# Patient Record
Sex: FEMALE | Race: BLACK OR AFRICAN AMERICAN | Employment: PART TIME | URBAN - METROPOLITAN AREA
[De-identification: names, ages, dates, MRNs, and addresses within clinical notes are randomized per-mention and may not be internally consistent; named-entity substitution may affect disease eponyms.]

---

## 2022-12-20 ENCOUNTER — HOSPITAL ENCOUNTER (EMERGENCY)
Age: 32
Discharge: HOME | End: 2022-12-20
Attending: EMERGENCY MEDICINE
Payer: COMMERCIAL

## 2022-12-20 VITALS
SYSTOLIC BLOOD PRESSURE: 109 MMHG | HEART RATE: 82 BPM | OXYGEN SATURATION: 99 % | TEMPERATURE: 97.5 F | WEIGHT: 165 LBS | BODY MASS INDEX: 25.84 KG/M2 | RESPIRATION RATE: 16 BRPM | DIASTOLIC BLOOD PRESSURE: 75 MMHG

## 2022-12-20 DIAGNOSIS — Z34.90 PREGNANCY, UNSPECIFIED GESTATIONAL AGE: ICD-10-CM

## 2022-12-20 DIAGNOSIS — K59.00 CONSTIPATION, UNSPECIFIED CONSTIPATION TYPE: Primary | ICD-10-CM

## 2022-12-20 LAB
CHP ED QC CHECK: NORMAL
HCG QUANTITATIVE: ABNORMAL MIU/ML
PREGNANCY TEST URINE, POC: POSITIVE

## 2022-12-20 PROCEDURE — 84702 CHORIONIC GONADOTROPIN TEST: CPT

## 2022-12-20 PROCEDURE — 99283 EMERGENCY DEPT VISIT LOW MDM: CPT

## 2022-12-20 RX ORDER — SENNA AND DOCUSATE SODIUM 50; 8.6 MG/1; MG/1
2 TABLET, FILM COATED ORAL ONCE
Status: DISCONTINUED | OUTPATIENT
Start: 2022-12-20 | End: 2022-12-20 | Stop reason: HOSPADM

## 2022-12-20 RX ORDER — SENNA AND DOCUSATE SODIUM 50; 8.6 MG/1; MG/1
2 TABLET, FILM COATED ORAL 2 TIMES DAILY
Qty: 40 TABLET | Refills: 0 | Status: SHIPPED | OUTPATIENT
Start: 2022-12-20 | End: 2022-12-30

## 2022-12-20 ASSESSMENT — ENCOUNTER SYMPTOMS
DIARRHEA: 0
CHEST TIGHTNESS: 0
CONSTIPATION: 1
NAUSEA: 1
ABDOMINAL PAIN: 1
SHORTNESS OF BREATH: 0
SORE THROAT: 0
ANAL BLEEDING: 0
PHOTOPHOBIA: 0
ABDOMINAL DISTENTION: 0
VOMITING: 0
RHINORRHEA: 0

## 2022-12-20 NOTE — ED TRIAGE NOTES
Pt arrives to ED 25 via triage. Pt co constipation. Pt states she has not had a bowel movement in about 2 weeks. Pt states she has been using laxatives the last 3 days. Pt states last time she used a laxative was this morning. Pt denies any chest pain or SOB. Pt states she has abdominal discomfort and cramping but no pain. Pt states she tried having a BM today but nothing came out. Pt respirations are even and unlabored, pt is alert and oriented X 4, speaking in complete sentences, bed is in the lowest position, call light is within reach, NAD noted. Will continue to follow plan of care.

## 2022-12-20 NOTE — ED PROVIDER NOTES
[facility] ED  Emergency Department Encounter  EmergencyMedicine Resident     Pt Amy Proctor  MRN: [id number]  [place] 1990  Date of evaluation: 12/20/22  PCP:  SUNDAR James CNP    CHIEF COMPLAINT       Chief Complaint   Patient presents with    Constipation     X 2 weeks       HISTORY OF PRESENT ILLNESS  (Location/Symptom, Timing/Onset, Context/Setting, Quality, Duration, Modifying Factors, Severity.)      Kody Patterson is a 34 y.o. female who presents with constipation for 2 weeks. Patient states she has not had a bowel movement 2 weeks. She states she had a small 1 today and is infrequently passing gas. She states she is having some nausea but no episodes of emesis. She denies any previous abdominal surgeries. PAST MEDICAL / SURGICAL / SOCIAL / FAMILY HISTORY      has no past medical history on file. has no past surgical history on file.       Social History     Socioeconomic History    Marital status: Single     Spouse name: Not on file    Number of children: Not on file    Years of education: Not on file    Highest education level: Not on file   Occupational History    Not on file   Tobacco Use    Smoking status: Never    Smokeless tobacco: Never   Substance and Sexual Activity    Alcohol use: Yes     Comment: occasional    Drug use: Never    Sexual activity: Not on file   Other Topics Concern    Not on file   Social History Narrative    Not on file     Social Determinants of Health     Financial Resource Strain: Low Risk     Difficulty of Paying Living Expenses: Not hard at all   Food Insecurity: No Food Insecurity    Worried About Running Out of Food in the Last Year: Never true    Ran Out of Food in the Last Year: Never true   Transportation Needs: Not on file   Physical Activity: Not on file   Stress: Not on file   Social Connections: Not on file   Intimate Partner Violence: Not on file   Housing Stability: Not on file       No family history on file.    Allergies:  Patient has no known allergies. Home Medications:  Prior to Admission medications    Medication Sig Start Date End Date Taking? Authorizing Provider   sennosides-docusate sodium (SENOKOT-S) 8.6-50 MG tablet Take 2 tablets by mouth in the morning and at bedtime for 10 days 12/20/22 12/30/22 Yes Cony Givens, DO       REVIEW OF SYSTEMS    (2-9 systems for level 4, 10 or more for level 5)      Review of Systems   Constitutional:  Negative for chills and fever. HENT:  Negative for rhinorrhea and sore throat. Eyes:  Negative for photophobia. Respiratory:  Negative for chest tightness and shortness of breath. Cardiovascular:  Negative for chest pain. Gastrointestinal:  Positive for abdominal pain, constipation and nausea. Negative for abdominal distention, anal bleeding, diarrhea and vomiting. Endocrine: Negative for polyuria. Genitourinary:  Negative for difficulty urinating and flank pain. Musculoskeletal:  Negative for arthralgias. Skin:  Negative for rash. Neurological:  Negative for weakness and headaches. PHYSICAL EXAM   (up to 7 for level 4, 8 or more for level 5)      INITIAL VITALS:   /75   Pulse 82   Temp 97.5 °F (36.4 °C) (Oral)   Resp 16   Wt 165 lb (74.8 kg)   LMP 05/14/2022 (Within Days)   SpO2 99%   BMI 25.84 kg/m²     Physical Exam  Constitutional:       General: She is not in acute distress. Appearance: She is not ill-appearing. HENT:      Head: Normocephalic and atraumatic. Nose: Nose normal.      Mouth/Throat:      Mouth: Mucous membranes are moist.   Eyes:      Extraocular Movements: Extraocular movements intact. Pupils: Pupils are equal, round, and reactive to light. Cardiovascular:      Rate and Rhythm: Normal rate. Pulses: Normal pulses. Heart sounds: Normal heart sounds. Pulmonary:      Effort: Pulmonary effort is normal.      Breath sounds: Normal breath sounds.    Abdominal:      General: There is no distension. Palpations: Abdomen is soft. There is no mass. Tenderness: There is no abdominal tenderness. There is no guarding or rebound. Musculoskeletal:      Cervical back: No tenderness. Right lower leg: No edema. Left lower leg: No edema. Skin:     Capillary Refill: Capillary refill takes less than 2 seconds. Coloration: Skin is not jaundiced. Neurological:      General: No focal deficit present. Mental Status: She is oriented to person, place, and time. DIFFERENTIAL  DIAGNOSIS     PLAN (LABS / IMAGING / EKG):  Orders Placed This Encounter   Procedures    HCG, QUANTITATIVE, PREGNANCY    POCT urine pregnancy       MEDICATIONS ORDERED:  Orders Placed This Encounter   Medications    sennosides-docusate sodium (SENOKOT-S) 8.6-50 MG tablet 2 tablet    sennosides-docusate sodium (SENOKOT-S) 8.6-50 MG tablet     Sig: Take 2 tablets by mouth in the morning and at bedtime for 10 days     Dispense:  40 tablet     Refill:  0         DIAGNOSTIC RESULTS / EMERGENCY DEPARTMENT COURSE / MDM   LAB RESULTS:  Results for orders placed or performed during the hospital encounter of 12/20/22   HCG, QUANTITATIVE, PREGNANCY   Result Value Ref Range    hCG Quant 95,513 (H) <5 mIU/mL   POCT urine pregnancy   Result Value Ref Range    Preg Test, Ur Positive     QC OK? OK        RADIOLOGY:  No results found. EKG Interpretation  None    MDM  Here for constipation no signs and symptoms of obstruction. Patient noted to have a positive pregnancy bedside ultrasound performed intrauterine pregnancy with good fetal heart rate. Given OB follow-up. Patient discharged home    EMERGENCY DEPARTMENT COURSE:  ED Course as of 12/20/22 1949   Tue Dec 20, 2022   1722 Patient value bedside. Constipation for 2 weeks. Belly soft nontender nondistended. Low suspicion for small bowel obstruction or ileus. We will get an acute abdominal series. We will do rectal exam to evaluate for stool ball.   Plan for enema [ZE]   2267 Patient's point-of-care urine pregnancy is positive. We will get serum quant pregnancy. We will hold off on x-ray at this time [ZE]   1946 Bedside ultrasound performed. Heart rate 156. Intrauterine pregnancy identified. Patient states she wants to leave will discharge with OB follow-up [ZE]      ED Course User Index  [ZE] Mey Munoz DO       PROCEDURES:  Procedures     CONSULTS:  None    CRITICAL CARE:  None    FINAL IMPRESSION      1. Constipation, unspecified constipation type    2.  Pregnancy, unspecified gestational age          [de-identified] / PLAN     DISPOSITION Decision To Discharge 12/20/2022 07:44:34 PM      PATIENT REFERRED TO:  [address] [facility]  [address] [facility]  [phone]    Call for [facility]  [address] [facility]  [phone]    If symptoms worsen    Pankaj Colorado, APRN - CNP  [address] [facility]  [phone]    In 1 day      DISCHARGE MEDICATIONS:  New Prescriptions    SENNOSIDES-DOCUSATE SODIUM (SENOKOT-S) 8.6-50 MG TABLET    Take 2 tablets by mouth in the morning and at bedtime for 10 days       Hussein Boyce DO  Emergency Medicine Resident    (Please note that portions of thisnote were completed with a voice recognition program.  Efforts were made to edit the dictations but occasionally words are mis-transcribed.)        Mey Munoz DO  Resident  12/20/22 1949

## 2022-12-20 NOTE — Clinical Note
Kody Patterson was seen and treated in our emergency department on 12/20/2022. She may return to work on 12/22/2022. If you have any questions or concerns, please don't hesitate to call.       Eb Ch, DO

## 2022-12-20 NOTE — ED NOTES
Pt respirations are even and unlabored, pt is alert and oriented X 4, speaking in complete sentences, bed is in the lowest position, call light is within reach, NAD noted. Will continue to follow plan of care.         Nick Salguero RN  12/20/22 0685

## 2022-12-20 NOTE — ED PROVIDER NOTES
[address] [facility]     Emergency Department     Faculty Attestation    I performed a history and physical examination of the patient and discussed management with the resident. I reviewed the residents note and agree with the documented findings and plan of care. Any areas of disagreement are noted on the chart. I was personally present for the key portions of any procedures. I have documented in the chart those procedures where I was not present during the key portions. I have reviewed the emergency nurses triage note. I agree with the chief complaint, past medical history, past surgical history, allergies, medications, social and family history as documented unless otherwise noted below. For Physician Assistant/ Nurse Practitioner cases/documentation I have personally evaluated this patient and have completed at least one if not all key elements of the E/M (history, physical exam, and MDM). Additional findings are as noted. I have personally seen and evaluated the patient. I find the patient's history and physical exam are consistent with the NP/PA documentation. I agree with the care provided, treatment rendered, disposition and follow-up plan. 26-year-old female presenting with constipation. Has had 2 weeks of constipation, passing just small nodules or stool. Still passing gas. Feels increasing fullness and bloating after eating. No vomiting. No similar symptoms in the past.    Exam:  General : Laying on the bed, awake, alert, and in no acute distress  CV : normal rate and regular rhythm  Lungs : Breathing comfortably on room air with no tachypnea, hypoxia, or increased work of breathing  Abdomen : soft, non-tender, non-distended    Plan:  DDx: Constipation, bowel obstruction, mass  POCT pregnancy obtained to facilitate abdominal radiography to rule out SBO or ileus    Pregnancy test positive. Will obtain quantitative hCG.   If pregnant, will plan on transvaginal ultrasound to evaluate presence of IUP. If not pregnant, will proceed with rectal examination, enema.     Sky Ruiz MD   Attending Emergency Physician    (Please note that portions of this note were completed with a voice recognition program. Efforts were made to edit the dictations but occasionally words are mis-transcribed.)            Sky Ruiz MD  12/20/22 8811

## 2022-12-21 NOTE — ED NOTES
\"I really need to go, I have kids waiting at home\" as verbalized by the patient. Pt is requesting to be discharge ASAP. Reported to Dr Jeimy Rodriguez.      Charlene Turner RN  12/20/22 1950

## 2022-12-21 NOTE — DISCHARGE INSTRUCTIONS
You were noted to be pregnant today please call OB/GYN tomorrow to set up an appointment to be evaluated soon as possible. If develop any abdominal pain or vaginal bleeding return the emergency department call 911. Take medications prescribed to you for constipation if you develop increased abdominal pain, nausea, vomiting or abdominal distention or inability to have bowel movements return to the emergency department or call 911.

## 2023-07-24 ENCOUNTER — TELEPHONE (OUTPATIENT)
Dept: OBGYN | Age: 33
End: 2023-07-24

## 2023-07-24 NOTE — TELEPHONE ENCOUNTER
Patient called the office wanting to continue her post partum care with our office. Patient delivered at [facility] 7/23/23 at 38wks. Patient states all her children follow with [address] [place] [address] so she would like to transition to [address] [place] [address] as well. Please advise.

## 2024-12-14 ENCOUNTER — HOSPITAL ENCOUNTER (EMERGENCY)
Age: 34
Discharge: HOME | End: 2024-12-14
Attending: EMERGENCY MEDICINE
Payer: COMMERCIAL

## 2024-12-14 VITALS
RESPIRATION RATE: 17 BRPM | DIASTOLIC BLOOD PRESSURE: 91 MMHG | OXYGEN SATURATION: 100 % | HEART RATE: 77 BPM | SYSTOLIC BLOOD PRESSURE: 130 MMHG | TEMPERATURE: 98.4 F

## 2024-12-14 DIAGNOSIS — N89.8 VAGINAL DISCHARGE: ICD-10-CM

## 2024-12-14 DIAGNOSIS — T83.32XA INTRAUTERINE CONTRACEPTIVE DEVICE THREADS LOST, INITIAL ENCOUNTER: Primary | ICD-10-CM

## 2024-12-14 LAB
BACTERIA URNS QL MICRO: ABNORMAL
BILIRUB UR QL STRIP: NEGATIVE
C TRACH DNA SPEC QL PROBE+SIG AMP: NORMAL
CANDIDA SPECIES: NEGATIVE
CASTS #/AREA URNS LPF: ABNORMAL /LPF (ref 0–8)
CHLAMYDIA DNA UR QL NAA+PROBE: NORMAL
CLARITY UR: CLEAR
COLOR UR: YELLOW
EPI CELLS #/AREA URNS HPF: ABNORMAL /HPF (ref 0–5)
GARDNERELLA VAGINALIS: POSITIVE
GLUCOSE UR STRIP-MCNC: NEGATIVE MG/DL
HCG UR QL: NEGATIVE
HGB UR QL STRIP.AUTO: ABNORMAL
KETONES UR STRIP-MCNC: NEGATIVE MG/DL
LEUKOCYTE ESTERASE UR QL STRIP: NEGATIVE
N GONORRHOEA DNA SPEC QL PROBE+SIG AMP: NORMAL
N GONORRHOEA DNA UR QL NAA+PROBE: NORMAL
NITRITE UR QL STRIP: NEGATIVE
PH UR STRIP: 7 [PH] (ref 5–8)
PROT UR STRIP-MCNC: NEGATIVE MG/DL
RBC #/AREA URNS HPF: ABNORMAL /HPF (ref 0–4)
SOURCE: ABNORMAL
SP GR UR STRIP: 1.01 (ref 1–1.03)
SPECIMEN DESCRIPTION: NORMAL
SPECIMEN DESCRIPTION: NORMAL
TRICHOMONAS: NEGATIVE
UROBILINOGEN UR STRIP-ACNC: NORMAL EU/DL (ref 0–1)
WBC #/AREA URNS HPF: ABNORMAL /HPF (ref 0–5)

## 2024-12-14 PROCEDURE — 81001 URINALYSIS AUTO W/SCOPE: CPT

## 2024-12-14 PROCEDURE — 87660 TRICHOMONAS VAGIN DIR PROBE: CPT

## 2024-12-14 PROCEDURE — 87591 N.GONORRHOEAE DNA AMP PROB: CPT

## 2024-12-14 PROCEDURE — 87510 GARDNER VAG DNA DIR PROBE: CPT

## 2024-12-14 PROCEDURE — 87491 CHLMYD TRACH DNA AMP PROBE: CPT

## 2024-12-14 PROCEDURE — 87480 CANDIDA DNA DIR PROBE: CPT

## 2024-12-14 PROCEDURE — 99283 EMERGENCY DEPT VISIT LOW MDM: CPT

## 2024-12-14 PROCEDURE — 81025 URINE PREGNANCY TEST: CPT

## 2024-12-14 RX ORDER — METRONIDAZOLE 500 MG/1
500 TABLET ORAL 2 TIMES DAILY
Qty: 20 TABLET | Refills: 0 | Status: SHIPPED | OUTPATIENT
Start: 2024-12-14 | End: 2024-12-24

## 2024-12-15 NOTE — ED NOTES
Lab states they are running urine sample  
Pt ambulated to bathroom with a steady gait   
Statement Selected

## 2024-12-15 NOTE — DISCHARGE INSTRUCTIONS
-You were seen and evaluated by emergency medicine physicians at [facility].    -Please follow-up with your primary care physician and/or with the referrals to specialist.    -You were diagnosed with:.  IUD strings lost, vaginal discharge    -You need to go home prior to completion of your workup.  This was an informed discharge.  Please follow-up on MyChart for the results of your vaginal swabs.  If they come back positive for any infection, please see your PCP for treatment.  -Point-of-care ultrasound showed intrauterine device.  Pelvic exam showed no visible strings at this time.  -OB was consulted.  They would like you to follow-up with their clinic or go to your established OB/GYN for further management of your copper IUD    -Please return to the Emergency Department if you are experiencing the following symptoms acutely: Increased vaginal discharge, vaginal bleeding, increased lower abdominal pain, headache, fever, chills, nausea, vomiting, chest pain, shortness of breath, abdominal pain, change with urination, change with bowel movements, change in your skin/hair/nail, weakness, fatigue, altered mental status and/or any change from baseline health.    -Thank you for coming to [facility].

## 2024-12-15 NOTE — ED TRIAGE NOTES
Pt to ED stating that she cannot find her IUD strings. Pt states that about a week ago she was able to find them but now cannot. Pt wants to make sure everything it in there properly.

## 2024-12-15 NOTE — CONSULTS
OB/GYN Consult  [facility]    Patient Name: Sydnie Hodges     Patient : 1990  Room/Bed: [facility]  Admission Date/Time: 2024  7:27 PM  Primary Care Physician: Norma Owen APRN - CNP    Consulting Provider: Dr. Best  Reason for Consult: IUD strings not seen on exam    CC:   Chief Complaint   Patient presents with    Can't Find her IUD              This was a phone consult with the ED resident physician. This resident did not evaluate this patient.    Sydnie Hodges is a 34 y.o. female  presents to ED after doing an at home string check on her IUD and did not feel the strings. Reports she had copper IUD placed postpartum \"a year ago\". Per chart review, IUD placed approx Dec 2023. She otherwise has no physical complaints. Pelvic exam completed by ED resident, IUDs strings not seen, otherwise normal exam. BSUS by ED resident showed IUD is in correct intrauterine place. Patient to be counseled to make appointment with primary OBGYN for close follow up.       Yehuda Lopez MD  Ob/Gyn Resident  [facility]  2024, 9:10 PM

## 2024-12-15 NOTE — ED PROVIDER NOTES
[facility] EMERGENCY DEPARTMENT  Emergency Department Encounter  Emergency Medicine Resident     Pt Name:Sydnie Hodges  MRN: [id number]  Birthdate 1990  Date of evaluation: 12/14/24  PCP:  Norma Owen APRN - CNP  Note Started: 7:30 PM EST      CHIEF COMPLAINT       Chief Complaint   Patient presents with    Can't Find her IUD       HISTORY OF PRESENT ILLNESS  (Location/Symptom, Timing/Onset, Context/Setting, Quality, Duration, Modifying Factors, Severity.)      Sydnie Hodges is a 34-year-old female that presents to ED for concerns that she cannot find her IUD.  Patient states she felt the strings approximately a week ago but over the last 3 days she has not been able to feel the strings.  Patient was last sexually active 2 weeks ago with her .  No recent sexual activity.  Patient states she has been having mild abdominal cramping and some discharge.  She is also having small amounts of spotting and bleeding with no large blood clots.  Patient states that she has a semi-regular period that resolves and then typically a couple days later she will have a small amount of bleeding.  Patient is in a monogamous relationship.  Denies any headache, fever, chills, nausea, vomiting, chest pain.  No upper abdominal discomfort.    Chart review shows the patient has been seen at other facilities for similar complaint    PAST MEDICAL / SURGICAL / SOCIAL / FAMILY HISTORY      has no past medical history on file.       has no past surgical history on file.      Social History     Socioeconomic History    Marital status: Single     Spouse name: Not on file    Number of children: Not on file    Years of education: Not on file    Highest education level: Not on file   Occupational History    Not on file   Tobacco Use    Smoking status: Never    Smokeless tobacco: Never   Substance and Sexual Activity    Alcohol use: Yes     Comment: occasional    Drug use: Never    Sexual activity: Not on file   Other 
present    EMERGENCY DEPARTMENT COURSE:     -------------------------  BP: (!) 130/91, Temp: 98.4 °F (36.9 °C), Pulse: 77, Respirations: 17  Physical Exam __  Constitutional:  oriented to person, place, and time.  appears well-developed and well-nourished.   HENT: no facial swelling or edema  Head: Normocephalic and atraumatic.   Eyes: Right eye exhibits no discharge. Left eye exhibits no discharge. No scleral icterus.   Neck: No JVD present. No tracheal deviation present.   Cardiovascular: pulses present in extremities  Pulmonary/Chest: Effort normal. No respiratory distress.   Musculoskeletal: Normal range of motion. exhibits no edema.   Neurological: they are alert and oriented to person, place, and time.  Skin: Skin is warm and dry.   Psychiatric: has a normal mood and affect.  behavior is normal.      Comments  Medical Decision Making  Amount and/or Complexity of Data Reviewed  Labs: ordered.    Abdominal ultrasound shows IUD is in the uterus will need to perform pelvic exam to ensure we can still see the appropriate location of the strings.  Will obtain swabs as well      ED Course as of 12/14/24 1951   Sat Dec 14, 2024   1930 Patient is a 34-year-old presents to ED for concerns that she cannot find her IUD.  Patient states she felt to approximate week ago but over the last 3 days she has not been able to feel the strings.  Patient was sexually active 2 weeks ago with her .  No recent sexual activity.  Patient states she is been having mild abdominal cramping and some discharge.  She is also had small amount of spotting and bleeding with no large blood clots.  Patient states that she barely has a period that resolved and then a couple days of minimal amount of bleeding.  Patient is monogamous relationship.  Denies any headache, fever, chills, nausea, vomiting, chest pain.  No upper abdominal discomfort.    Physical Exam:      Concern for:     Plan/Impression:    [AS]      ED Course User Index  [AS]

## 2024-12-16 LAB
C TRACH DNA SPEC QL PROBE+SIG AMP: NEGATIVE
CHLAMYDIA DNA UR QL NAA+PROBE: NEGATIVE
N GONORRHOEA DNA SPEC QL PROBE+SIG AMP: NEGATIVE
N GONORRHOEA DNA UR QL NAA+PROBE: NEGATIVE
SPECIMEN DESCRIPTION: NORMAL
SPECIMEN DESCRIPTION: NORMAL

## 2025-02-11 ENCOUNTER — OFFICE VISIT (OUTPATIENT)
Dept: OBGYN CLINIC | Age: 35
End: 2025-02-11
Payer: COMMERCIAL

## 2025-02-11 ENCOUNTER — HOSPITAL ENCOUNTER (OUTPATIENT)
Age: 35
Setting detail: SPECIMEN
Discharge: HOME | End: 2025-02-11

## 2025-02-11 VITALS
BODY MASS INDEX: 26.37 KG/M2 | HEART RATE: 86 BPM | HEIGHT: 67 IN | WEIGHT: 168 LBS | DIASTOLIC BLOOD PRESSURE: 68 MMHG | SYSTOLIC BLOOD PRESSURE: 112 MMHG

## 2025-02-11 DIAGNOSIS — Z11.51 SCREENING FOR HPV (HUMAN PAPILLOMAVIRUS): ICD-10-CM

## 2025-02-11 DIAGNOSIS — Z01.419 WELL WOMAN EXAM: Primary | ICD-10-CM

## 2025-02-11 DIAGNOSIS — T83.32XA INTRAUTERINE CONTRACEPTIVE DEVICE THREADS LOST, INITIAL ENCOUNTER: ICD-10-CM

## 2025-02-11 DIAGNOSIS — Z87.42 HISTORY OF ABNORMAL CERVICAL PAP SMEAR: ICD-10-CM

## 2025-02-11 DIAGNOSIS — N89.8 VAGINAL DISCHARGE: ICD-10-CM

## 2025-02-11 PROCEDURE — 99385 PREV VISIT NEW AGE 18-39: CPT | Performed by: CLINICAL NURSE SPECIALIST

## 2025-02-11 PROCEDURE — G8419 CALC BMI OUT NRM PARAM NOF/U: HCPCS | Performed by: CLINICAL NURSE SPECIALIST

## 2025-02-11 PROCEDURE — G8427 DOCREV CUR MEDS BY ELIG CLIN: HCPCS | Performed by: CLINICAL NURSE SPECIALIST

## 2025-02-11 PROCEDURE — 1036F TOBACCO NON-USER: CPT | Performed by: CLINICAL NURSE SPECIALIST

## 2025-02-11 PROCEDURE — 99203 OFFICE O/P NEW LOW 30 MIN: CPT | Performed by: CLINICAL NURSE SPECIALIST

## 2025-02-11 RX ORDER — METRONIDAZOLE 500 MG/1
500 TABLET ORAL 2 TIMES DAILY
Qty: 14 TABLET | Refills: 0 | Status: SHIPPED | OUTPATIENT
Start: 2025-02-11 | End: 2025-02-18

## 2025-02-11 SDOH — ECONOMIC STABILITY: FOOD INSECURITY: WITHIN THE PAST 12 MONTHS, THE FOOD YOU BOUGHT JUST DIDN'T LAST AND YOU DIDN'T HAVE MONEY TO GET MORE.: NEVER TRUE

## 2025-02-11 SDOH — ECONOMIC STABILITY: FOOD INSECURITY: WITHIN THE PAST 12 MONTHS, YOU WORRIED THAT YOUR FOOD WOULD RUN OUT BEFORE YOU GOT MONEY TO BUY MORE.: NEVER TRUE

## 2025-02-11 ASSESSMENT — PATIENT HEALTH QUESTIONNAIRE - PHQ9
SUM OF ALL RESPONSES TO PHQ QUESTIONS 1-9: 0
1. LITTLE INTEREST OR PLEASURE IN DOING THINGS: NOT AT ALL
SUM OF ALL RESPONSES TO PHQ9 QUESTIONS 1 & 2: 0
SUM OF ALL RESPONSES TO PHQ QUESTIONS 1-9: 0
2. FEELING DOWN, DEPRESSED OR HOPELESS: NOT AT ALL

## 2025-02-11 NOTE — PROGRESS NOTES
[facility] OBSTETRICS & GYNECOLOGY  [address] [facility] [address] [place] [id number]  Dept: [phone]        DATE OF VISIT:  25        History and Physical    Sydnie Hodges    :  1990  CHIEF COMPLAINT:    Chief Complaint   Patient presents with    New Patient                    Sydnie Hodges is a 34 y.o. female new patient presents for annual well woman exam.     The patient was seen and examined.  Per the patient bowels are regular. She has no voiding complaints.  She denies any bloating as well as vaginal discharge.    Chaperone for Intimate Exam  Chaperone was offered as part of the rooming process. Patient declined and agrees to continue with exam without a chaperone.  Chaperone: none     _____________________________________________________________________  History reviewed. No pertinent past medical history.                                                                History reviewed. No pertinent surgical history.  History reviewed. No pertinent family history.  Social History     Tobacco Use   Smoking Status Never   Smokeless Tobacco Never     Social History     Substance and Sexual Activity   Alcohol Use Yes    Comment: occasional     Current Outpatient Medications   Medication Sig Dispense Refill    metroNIDAZOLE (FLAGYL) 500 MG tablet Take 1 tablet by mouth 2 times daily for 7 days 14 tablet 0    Lactobacillus (ACIDOPHILUS PROBIOTIC) TABS Take 100 mg by mouth daily 30 tablet 11     No current facility-administered medications for this visit.       Allergies:  No Known Allergies    Gynecologic History:  Patient's last menstrual period was 2025.  Sexually Active: Yes  STD History:No  Birth Control: Yes IUD    OB History    Para Term  AB Living   4 3 3 0 1 3   SAB IAB Ectopic Molar Multiple Live Births   1 0 0 0 0 3     ______________________________________________________________________    Review of

## 2025-02-12 LAB
HPV I/H RISK 4 DNA CVX QL NAA+PROBE: NOT DETECTED
HPV SAMPLE: NORMAL
HPV, INTERPRETATION: NORMAL
HPV16 DNA CVX QL NAA+PROBE: NOT DETECTED
HPV18 DNA CVX QL NAA+PROBE: NOT DETECTED
SPECIMEN DESCRIPTION: NORMAL

## 2025-02-20 LAB — CYTOLOGY REPORT: NORMAL

## 2025-06-24 ENCOUNTER — OFFICE VISIT (OUTPATIENT)
Dept: OBGYN CLINIC | Age: 35
End: 2025-06-24
Payer: COMMERCIAL

## 2025-06-24 VITALS
BODY MASS INDEX: 23.39 KG/M2 | HEART RATE: 81 BPM | SYSTOLIC BLOOD PRESSURE: 112 MMHG | DIASTOLIC BLOOD PRESSURE: 80 MMHG | HEIGHT: 67 IN | WEIGHT: 149 LBS

## 2025-06-24 DIAGNOSIS — T83.32XA INTRAUTERINE CONTRACEPTIVE DEVICE THREADS LOST, INITIAL ENCOUNTER: ICD-10-CM

## 2025-06-24 DIAGNOSIS — Z30.432 ENCOUNTER FOR IUD REMOVAL: Primary | ICD-10-CM

## 2025-06-24 PROCEDURE — 99213 OFFICE O/P EST LOW 20 MIN: CPT | Performed by: CLINICAL NURSE SPECIALIST

## 2025-06-24 PROCEDURE — G8427 DOCREV CUR MEDS BY ELIG CLIN: HCPCS | Performed by: CLINICAL NURSE SPECIALIST

## 2025-06-24 PROCEDURE — G8420 CALC BMI NORM PARAMETERS: HCPCS | Performed by: CLINICAL NURSE SPECIALIST

## 2025-06-24 PROCEDURE — 1036F TOBACCO NON-USER: CPT | Performed by: CLINICAL NURSE SPECIALIST

## 2025-06-24 PROCEDURE — 58301 REMOVE INTRAUTERINE DEVICE: CPT | Performed by: CLINICAL NURSE SPECIALIST

## 2025-06-24 ASSESSMENT — ENCOUNTER SYMPTOMS
RESPIRATORY NEGATIVE: 1
GASTROINTESTINAL NEGATIVE: 1
EYES NEGATIVE: 1
ALLERGIC/IMMUNOLOGIC NEGATIVE: 1

## 2025-06-24 NOTE — PROGRESS NOTES
Out of Food in the Last Year: Never true   Transportation Needs: No Transportation Needs (2/11/2025)    PRAPARE - Transportation     Lack of Transportation (Medical): No     Lack of Transportation (Non-Medical): No   Housing Stability: Low Risk  (2/11/2025)    Housing Stability Vital Sign     Unable to Pay for Housing in the Last Year: No     Number of Times Moved in the Last Year: 1     Homeless in the Last Year: No      No current outpatient medications on file.     No current facility-administered medications for this visit.        Objective:   Physical Exam  Vitals reviewed.   Constitutional:       Appearance: Normal appearance. She is well-developed.   HENT:      Head: Normocephalic and atraumatic.   Cardiovascular:      Rate and Rhythm: Normal rate and regular rhythm.   Pulmonary:      Effort: Pulmonary effort is normal.      Breath sounds: Normal breath sounds.   Musculoskeletal:         General: Normal range of motion.   Skin:     General: Skin is warm and dry.   Neurological:      Mental Status: She is alert and oriented to person, place, and time.   Psychiatric:         Behavior: Behavior normal.         Thought Content: Thought content normal.         Judgment: Judgment normal.         Assessment:      Diagnosis Orders   1. Encounter for IUD removal  REMOVE INTRAUTERINE DEVICE    US NON OB TRANSVAGINAL    US PELVIS COMPLETE      2. Intrauterine contraceptive device threads lost, initial encounter  US NON OB TRANSVAGINAL    US PELVIS COMPLETE            Plan:      The patient name, age and sex was seen with total time spent of 20 minutes for the visit on this date of service by the E/M provider.  The time component had both face to face and non face to face time spent in determining the total time component.  Counseling and education regarding her diagnosis listed below are her options regarding those diagnoses included in determining her time component.         Diagnosis Orders   1. Encounter for IUD